# Patient Record
Sex: FEMALE | Race: WHITE | NOT HISPANIC OR LATINO | URBAN - METROPOLITAN AREA
[De-identification: names, ages, dates, MRNs, and addresses within clinical notes are randomized per-mention and may not be internally consistent; named-entity substitution may affect disease eponyms.]

---

## 2020-02-10 ENCOUNTER — OUTPATIENT (OUTPATIENT)
Dept: OUTPATIENT SERVICES | Facility: HOSPITAL | Age: 25
LOS: 1 days | End: 2020-02-10
Payer: COMMERCIAL

## 2020-02-10 VITALS
OXYGEN SATURATION: 100 % | WEIGHT: 143.3 LBS | RESPIRATION RATE: 16 BRPM | SYSTOLIC BLOOD PRESSURE: 127 MMHG | HEIGHT: 62.25 IN | TEMPERATURE: 99 F | HEART RATE: 85 BPM | DIASTOLIC BLOOD PRESSURE: 82 MMHG

## 2020-02-10 DIAGNOSIS — M20.12 HALLUX VALGUS (ACQUIRED), LEFT FOOT: ICD-10-CM

## 2020-02-10 DIAGNOSIS — M21.612 BUNION OF LEFT FOOT: ICD-10-CM

## 2020-02-10 DIAGNOSIS — M20.5X2 OTHER DEFORMITIES OF TOE(S) (ACQUIRED), LEFT FOOT: ICD-10-CM

## 2020-02-10 DIAGNOSIS — M24.575 CONTRACTURE, LEFT FOOT: ICD-10-CM

## 2020-02-10 DIAGNOSIS — Z98.890 OTHER SPECIFIED POSTPROCEDURAL STATES: Chronic | ICD-10-CM

## 2020-02-10 DIAGNOSIS — Z01.818 ENCOUNTER FOR OTHER PREPROCEDURAL EXAMINATION: ICD-10-CM

## 2020-02-10 DIAGNOSIS — Z96.22 MYRINGOTOMY TUBE(S) STATUS: Chronic | ICD-10-CM

## 2020-02-10 DIAGNOSIS — M20.42 OTHER HAMMER TOE(S) (ACQUIRED), LEFT FOOT: ICD-10-CM

## 2020-02-10 DIAGNOSIS — K08.409 PARTIAL LOSS OF TEETH, UNSPECIFIED CAUSE, UNSPECIFIED CLASS: Chronic | ICD-10-CM

## 2020-02-10 LAB
HCT VFR BLD CALC: 40.8 % — SIGNIFICANT CHANGE UP (ref 34.5–45)
HGB BLD-MCNC: 13.8 G/DL — SIGNIFICANT CHANGE UP (ref 11.5–15.5)
MCHC RBC-ENTMCNC: 31.7 PG — SIGNIFICANT CHANGE UP (ref 27–34)
MCHC RBC-ENTMCNC: 33.8 GM/DL — SIGNIFICANT CHANGE UP (ref 32–36)
MCV RBC AUTO: 93.6 FL — SIGNIFICANT CHANGE UP (ref 80–100)
NRBC # BLD: 0 /100 WBCS — SIGNIFICANT CHANGE UP (ref 0–0)
PLATELET # BLD AUTO: 221 K/UL — SIGNIFICANT CHANGE UP (ref 150–400)
RBC # BLD: 4.36 M/UL — SIGNIFICANT CHANGE UP (ref 3.8–5.2)
RBC # FLD: 11.9 % — SIGNIFICANT CHANGE UP (ref 10.3–14.5)
WBC # BLD: 9.85 K/UL — SIGNIFICANT CHANGE UP (ref 3.8–10.5)
WBC # FLD AUTO: 9.85 K/UL — SIGNIFICANT CHANGE UP (ref 3.8–10.5)

## 2020-02-10 PROCEDURE — G0463: CPT

## 2020-02-10 PROCEDURE — 85027 COMPLETE CBC AUTOMATED: CPT

## 2020-02-10 PROCEDURE — 36415 COLL VENOUS BLD VENIPUNCTURE: CPT

## 2020-02-10 NOTE — H&P PST ADULT - HISTORY OF PRESENT ILLNESS
23 yo female presents with a left bunion for as long as she can remember. States that she has the beginnings of a 2nd hammer toe. She reports pain in the foot 2/10 at rest with shoes off and increased to 6/10 pain with activity and shoes on. Denies prior treatment or any current treatment other than rest.

## 2020-02-10 NOTE — H&P PST ADULT - NSICDXFAMILYHX_GEN_ALL_CORE_FT
FAMILY HISTORY:  Father  Still living? Yes, Estimated age: 51-60  Family history of hypertension, Age at diagnosis: Age Unknown    Mother  Still living? No  Family history of DVT, Age at diagnosis: Age Unknown  Family history of hypertension, Age at diagnosis: Age Unknown  Family history of lung cancer, Age at diagnosis: Age Unknown

## 2020-02-10 NOTE — H&P PST ADULT - RS GEN PE MLT RESP DETAILS PC
airway patent/no rales/no wheezes/clear to auscultation bilaterally/no rhonchi/good air movement/breath sounds equal/respirations non-labored

## 2020-02-10 NOTE — H&P PST ADULT - ATTENDING COMMENTS
Pt reports accidentally burning left upper extremity on burning stove last night; c/o mild discomfort over affected area.  PE left upper extremity with approx 3x5cm area of erythema on medio-lateral aspect; possible early blister formation centrally with no break in skin  will prescribe Silvadene oint 1% applied daily, cover w DSD until healed; pt advised to seek medical f/u for signs of infection.  Other than stated issue above, ROS and medical status unchanged since pt saw PMD for medical clearance,    Physician Assistant Statement 02-21-20 Pt reports accidentally burning left upper extremity on hot stove last night; c/o mild discomfort over affected area.  PE left upper extremity with approx 3x5cm area of erythema on medio-lateral aspect; possible early blister formation centrally with no break in skin  will prescribe Silvadene oint 1% applied daily, cover w DSD until healed; pt advised to seek medical f/u for signs of infection.  Other than stated issue above, ROS and medical status unchanged since pt saw PMD for medical clearance,    Physician Assistant Statement 02-21-20

## 2020-02-10 NOTE — H&P PST ADULT - NSANTHOSAYNRD_GEN_A_CORE
No. STEPHEN screening performed.  STOP BANG Legend: 0-2 = LOW Risk; 3-4 = INTERMEDIATE Risk; 5-8 = HIGH Risk/neck 14 inches

## 2020-02-10 NOTE — H&P PST ADULT - NSICDXPASTSURGICALHX_GEN_ALL_CORE_FT
PAST SURGICAL HISTORY:  H/O umbilical hernia repair as infant    S/P tube myringotomy multiple episodes bilateral, last 2010, tubes no longer in    [place] teeth extracted 2018

## 2020-02-10 NOTE — H&P PST ADULT - MUSCULOSKELETAL
details… detailed exam no joint warmth/no calf tenderness/diminished strength/no joint swelling/no joint erythema/tender left bunion/decreased ROM due to pain

## 2020-02-10 NOTE — H&P PST ADULT - NSICDXPROBLEM_GEN_ALL_CORE_FT
PROBLEM DIAGNOSES  Problem: Bunion, left foot  Assessment and Plan: chino akin left foot, tenotomy and capsulotomy digits 2,3,4,5. medical clearance requested. pepcid and surgical wash instructions reviewed and verbalized understanding.

## 2020-02-10 NOTE — H&P PST ADULT - PRO PAIN LIFE ADAPT
Medicare Wellness Visit, Female The best way to live healthy is to have a lifestyle where you eat a well-balanced diet, exercise regularly, limit alcohol use, and quit all forms of tobacco/nicotine, if applicable. Regular preventive services are another way to keep healthy. Preventive services (vaccines, screening tests, monitoring & exams) can help personalize your care plan, which helps you manage your own care. Screening tests can find health problems at the earliest stages, when they are easiest to treat. [facility] follows the current, evidence-based guidelines published by the [facility] ([facility]) when recommending preventive services for our patients. Because we follow these guidelines, sometimes recommendations change over time as research supports it. (For example, mammograms used to be recommended annually. Even though Medicare will still pay for an annual mammogram, the newer guidelines recommend a mammogram every two years for women of average risk.) Of course, you and your doctor may decide to screen more often for some diseases, based on your risk and your health status. Preventive services for you include: - Medicare offers their members a free annual wellness visit, which is time for you and your primary care provider to discuss and plan for your preventive service needs. Take advantage of this benefit every year! 
-All adults over the age of 72 should receive the recommended pneumonia vaccines. Current USPSTF guidelines recommend a series of two vaccines for the best pneumonia protection.  
-All adults should have a flu vaccine yearly and a tetanus vaccine every 10 years. All adults age 61 and older should receive a shingles vaccine once in their lifetime.   
-A bone mass density test is recommended when a woman turns 65 to screen for osteoporosis. This test is only recommended one time, as a screening. Some providers will use this same test as a disease monitoring tool if you already have osteoporosis. -All adults age 38-68 who are overweight should have a diabetes screening test once every three years.  
-Other screening tests and preventive services for persons with diabetes include: an eye exam to screen for diabetic retinopathy, a kidney function test, a foot exam, and stricter control over your cholesterol.  
-Cardiovascular screening for adults with routine risk involves an electrocardiogram (ECG) at intervals determined by your doctor.  
-Colorectal cancer screenings should be done for adults age 54-65 with no increased risk factors for colorectal cancer. There are a number of acceptable methods of screening for this type of cancer. Each test has its own benefits and drawbacks. Discuss with your doctor what is most appropriate for you during your annual wellness visit. The different tests include: colonoscopy (considered the best screening method), a fecal occult blood test, a fecal DNA test, and sigmoidoscopy. -Breast cancer screenings are recommended every other year for women of normal risk, age 54-69. 
-Cervical cancer screenings for women over age 72 are only recommended with certain risk factors.  
-All adults born between [facility] should be screened once for Hepatitis C. Here is a list of your current Health Maintenance items (your personalized list of preventive services) with a due date: There are no preventive care reminders to display for this patient. decreased activity level

## 2020-02-20 ENCOUNTER — TRANSCRIPTION ENCOUNTER (OUTPATIENT)
Age: 25
End: 2020-02-20

## 2020-02-21 ENCOUNTER — RESULT REVIEW (OUTPATIENT)
Age: 25
End: 2020-02-21

## 2020-02-21 ENCOUNTER — OUTPATIENT (OUTPATIENT)
Dept: OUTPATIENT SERVICES | Facility: HOSPITAL | Age: 25
LOS: 1 days | End: 2020-02-21
Payer: COMMERCIAL

## 2020-02-21 VITALS
OXYGEN SATURATION: 98 % | DIASTOLIC BLOOD PRESSURE: 78 MMHG | RESPIRATION RATE: 14 BRPM | HEART RATE: 93 BPM | TEMPERATURE: 98 F | SYSTOLIC BLOOD PRESSURE: 127 MMHG

## 2020-02-21 VITALS
OXYGEN SATURATION: 98 % | TEMPERATURE: 98 F | WEIGHT: 143.3 LBS | HEART RATE: 94 BPM | RESPIRATION RATE: 14 BRPM | SYSTOLIC BLOOD PRESSURE: 134 MMHG | HEIGHT: 62.25 IN | DIASTOLIC BLOOD PRESSURE: 91 MMHG

## 2020-02-21 DIAGNOSIS — M20.5X2 OTHER DEFORMITIES OF TOE(S) (ACQUIRED), LEFT FOOT: ICD-10-CM

## 2020-02-21 DIAGNOSIS — M20.42 OTHER HAMMER TOE(S) (ACQUIRED), LEFT FOOT: ICD-10-CM

## 2020-02-21 DIAGNOSIS — M20.12 HALLUX VALGUS (ACQUIRED), LEFT FOOT: ICD-10-CM

## 2020-02-21 DIAGNOSIS — Z96.22 MYRINGOTOMY TUBE(S) STATUS: Chronic | ICD-10-CM

## 2020-02-21 DIAGNOSIS — K08.409 PARTIAL LOSS OF TEETH, UNSPECIFIED CAUSE, UNSPECIFIED CLASS: Chronic | ICD-10-CM

## 2020-02-21 DIAGNOSIS — M24.575 CONTRACTURE, LEFT FOOT: ICD-10-CM

## 2020-02-21 DIAGNOSIS — Z98.890 OTHER SPECIFIED POSTPROCEDURAL STATES: Chronic | ICD-10-CM

## 2020-02-21 PROCEDURE — 88304 TISSUE EXAM BY PATHOLOGIST: CPT | Mod: 26

## 2020-02-21 PROCEDURE — 73620 X-RAY EXAM OF FOOT: CPT

## 2020-02-21 PROCEDURE — 28299 COR HLX VLGS DOUBLE OSTEOT: CPT | Mod: LT

## 2020-02-21 PROCEDURE — 73620 X-RAY EXAM OF FOOT: CPT | Mod: 26,LT

## 2020-02-21 PROCEDURE — C1713: CPT

## 2020-02-21 PROCEDURE — 28270 RELEASE OF FOOT CONTRACTURE: CPT | Mod: LT

## 2020-02-21 PROCEDURE — 88304 TISSUE EXAM BY PATHOLOGIST: CPT

## 2020-02-21 PROCEDURE — 28272 RELEASE OF TOE JOINT EACH: CPT | Mod: T2

## 2020-02-21 RX ORDER — SODIUM CHLORIDE 9 MG/ML
1000 INJECTION, SOLUTION INTRAVENOUS
Refills: 0 | Status: DISCONTINUED | OUTPATIENT
Start: 2020-02-21 | End: 2020-02-21

## 2020-02-21 RX ORDER — HYDROMORPHONE HYDROCHLORIDE 2 MG/ML
1 INJECTION INTRAMUSCULAR; INTRAVENOUS; SUBCUTANEOUS
Refills: 0 | Status: DISCONTINUED | OUTPATIENT
Start: 2020-02-21 | End: 2020-02-21

## 2020-02-21 RX ORDER — ONDANSETRON 8 MG/1
4 TABLET, FILM COATED ORAL ONCE
Refills: 0 | Status: DISCONTINUED | OUTPATIENT
Start: 2020-02-21 | End: 2020-02-21

## 2020-02-21 RX ORDER — HYDROMORPHONE HYDROCHLORIDE 2 MG/ML
0.5 INJECTION INTRAMUSCULAR; INTRAVENOUS; SUBCUTANEOUS
Refills: 0 | Status: DISCONTINUED | OUTPATIENT
Start: 2020-02-21 | End: 2020-02-21

## 2020-02-21 RX ADMIN — SODIUM CHLORIDE 50 MILLILITER(S): 9 INJECTION, SOLUTION INTRAVENOUS at 09:37

## 2020-02-21 NOTE — BRIEF OPERATIVE NOTE - NSICDXBRIEFPOSTOP_GEN_ALL_CORE_FT
POST-OP DIAGNOSIS:  Hammertoe of left foot 21-Feb-2020 13:15:54  Jaden Sultana  Bunion of left foot 21-Feb-2020 13:15:46  Jaden Sultana

## 2020-02-21 NOTE — ASU PATIENT PROFILE, ADULT - VISION (WITH CORRECTIVE LENSES IF THE PATIENT USUALLY WEARS THEM):
Partially impaired: cannot see medication labels or newsprint, but can see obstacles in path, and the surrounding layout; can count fingers at arm's length distance glasses only/Normal vision: sees adequately in most situations; can see medication labels, newsprint

## 2020-02-21 NOTE — BRIEF OPERATIVE NOTE - NSICDXBRIEFPREOP_GEN_ALL_CORE_FT
PRE-OP DIAGNOSIS:  Hammertoe of left foot 21-Feb-2020 13:15:33 2-5 Jaden Sultana  Bunion, left foot 21-Feb-2020 13:15:23  Jaden Sultana

## 2020-02-21 NOTE — ASU DISCHARGE PLAN (ADULT/PEDIATRIC) - CARE PROVIDER_API CALL
Jaden Sultana (DPM)  Podiatric Medicine and Surgery  [address] [place] [address]  Phone: [phone]  Fax: [phone]  Follow Up Time: 1-3 days

## 2020-02-21 NOTE — ASU DISCHARGE PLAN (ADULT/PEDIATRIC) - FREQUENT HAND WASHING PREVENTS THE SPREAD OF INFECTION.
Pt calling, states he spoke to his insurance and they told him this was denied.  He is wanting to know what else he can have? pls advise Statement Selected

## 2020-02-21 NOTE — ASU DISCHARGE PLAN (ADULT/PEDIATRIC) - NURSING INSTRUCTIONS
All discharge instructions reviewed with patient including pain, safety, medication, ambulation with cane and follow up care.  Pt acknowledges understanding

## 2020-02-21 NOTE — ASU DISCHARGE PLAN (ADULT/PEDIATRIC) - ACTIVITY LEVEL
No sports/gym/Weight bearing as tolerated/No excercise/No heavy lifting/Elevate extremity/No intercourse

## 2020-02-21 NOTE — BRIEF OPERATIVE NOTE - NSICDXBRIEFPROCEDURE_GEN_ALL_CORE_FT
PROCEDURES:  Capsulotomy, joint, toe 21-Feb-2020 13:14:57 2nd MTPJ release Jaden Sultana  Tenotomy, flexor, toe, open 21-Feb-2020 13:14:45  Jaden Sultana  Lawrence bunionectomy 21-Feb-2020 13:14:33  Jaden Sultana  Akin osteotomy 21-Feb-2020 13:14:22  Jaden Sultana  Modified Pang bunionectomy 21-Feb-2020 13:14:08  Jaden Sultana

## 2020-02-21 NOTE — ASU PATIENT PROFILE, ADULT - PSH
H/O umbilical hernia repair  as infant  S/P tube myringotomy  multiple episodes bilateral, last 2010, tubes no longer in  [place] teeth extracted  2018

## 2020-02-25 LAB — SURGICAL PATHOLOGY STUDY: SIGNIFICANT CHANGE UP

## 2020-03-20 PROBLEM — M21.612 BUNION OF LEFT FOOT: Chronic | Status: ACTIVE | Noted: 2020-02-10

## 2020-08-05 PROBLEM — Z00.00 ENCOUNTER FOR PREVENTIVE HEALTH EXAMINATION: Status: ACTIVE | Noted: 2020-08-05

## 2020-08-11 ENCOUNTER — OUTPATIENT (OUTPATIENT)
Dept: OUTPATIENT SERVICES | Facility: HOSPITAL | Age: 25
LOS: 1 days | End: 2020-08-11
Payer: COMMERCIAL

## 2020-08-11 VITALS
RESPIRATION RATE: 18 BRPM | WEIGHT: 148.15 LBS | HEART RATE: 71 BPM | OXYGEN SATURATION: 100 % | HEIGHT: 62.25 IN | DIASTOLIC BLOOD PRESSURE: 78 MMHG | TEMPERATURE: 99 F | SYSTOLIC BLOOD PRESSURE: 120 MMHG

## 2020-08-11 DIAGNOSIS — M20.5X1 OTHER DEFORMITIES OF TOE(S) (ACQUIRED), RIGHT FOOT: ICD-10-CM

## 2020-08-11 DIAGNOSIS — Z98.890 OTHER SPECIFIED POSTPROCEDURAL STATES: Chronic | ICD-10-CM

## 2020-08-11 DIAGNOSIS — M20.41 OTHER HAMMER TOE(S) (ACQUIRED), RIGHT FOOT: ICD-10-CM

## 2020-08-11 DIAGNOSIS — Z01.818 ENCOUNTER FOR OTHER PREPROCEDURAL EXAMINATION: ICD-10-CM

## 2020-08-11 DIAGNOSIS — M24.574 CONTRACTURE, RIGHT FOOT: ICD-10-CM

## 2020-08-11 DIAGNOSIS — K08.409 PARTIAL LOSS OF TEETH, UNSPECIFIED CAUSE, UNSPECIFIED CLASS: Chronic | ICD-10-CM

## 2020-08-11 DIAGNOSIS — M21.612 BUNION OF LEFT FOOT: Chronic | ICD-10-CM

## 2020-08-11 DIAGNOSIS — Z96.22 MYRINGOTOMY TUBE(S) STATUS: Chronic | ICD-10-CM

## 2020-08-11 DIAGNOSIS — M20.11 HALLUX VALGUS (ACQUIRED), RIGHT FOOT: ICD-10-CM

## 2020-08-11 LAB
HCT VFR BLD CALC: 41.6 % — SIGNIFICANT CHANGE UP (ref 34.5–45)
HGB BLD-MCNC: 13.6 G/DL — SIGNIFICANT CHANGE UP (ref 11.5–15.5)
MCHC RBC-ENTMCNC: 30.8 PG — SIGNIFICANT CHANGE UP (ref 27–34)
MCHC RBC-ENTMCNC: 32.7 GM/DL — SIGNIFICANT CHANGE UP (ref 32–36)
MCV RBC AUTO: 94.1 FL — SIGNIFICANT CHANGE UP (ref 80–100)
NRBC # BLD: 0 /100 WBCS — SIGNIFICANT CHANGE UP (ref 0–0)
PLATELET # BLD AUTO: 240 K/UL — SIGNIFICANT CHANGE UP (ref 150–400)
RBC # BLD: 4.42 M/UL — SIGNIFICANT CHANGE UP (ref 3.8–5.2)
RBC # FLD: 12.6 % — SIGNIFICANT CHANGE UP (ref 10.3–14.5)
WBC # BLD: 7.46 K/UL — SIGNIFICANT CHANGE UP (ref 3.8–10.5)
WBC # FLD AUTO: 7.46 K/UL — SIGNIFICANT CHANGE UP (ref 3.8–10.5)

## 2020-08-11 PROCEDURE — 36415 COLL VENOUS BLD VENIPUNCTURE: CPT

## 2020-08-11 PROCEDURE — G0463: CPT

## 2020-08-11 PROCEDURE — 85027 COMPLETE CBC AUTOMATED: CPT

## 2020-08-11 RX ORDER — SODIUM CHLORIDE 9 MG/ML
1000 INJECTION, SOLUTION INTRAVENOUS
Refills: 0 | Status: DISCONTINUED | OUTPATIENT
Start: 2020-08-21 | End: 2020-08-21

## 2020-08-11 RX ORDER — ACETAMINOPHEN 500 MG
2 TABLET ORAL
Qty: 0 | Refills: 0 | DISCHARGE

## 2020-08-11 NOTE — H&P PST ADULT - NSICDXPROBLEM_GEN_ALL_CORE_FT
PROBLEM DIAGNOSES  Problem: Other deformities of toe(s) (acquired), right foot  Assessment and Plan: Scheduled for Lawrence Marino right foot with Dr Sultana on 08/21/2020.  Pre op instructions given and paitnet verbalized understanding.  Chlorhexidine wash given with instructions.  CBC and medical clearance pending.  UCG in AM of procedure.  [place] [facility]

## 2020-08-11 NOTE — H&P PST ADULT - NS PRO TALK SOMEONE YN
CC: consult heme positive stool    History of Present Illness:  Naila Gallegos is a 67 year old female who presents at the request of Dr. Mauricio Diaz in regards to positive FIT.  Last colonoscopy done in 3/11/2005 by Dr. Roberson was incomplete due to nonflexible colon, showing mild diverticulosis. S/p barium enema 3/12/17 which was unremarkable, normal. Patient reports a bowel movement every day which is formed to soft. She had constipation one time after eating too much cheese around Thanksgiving time.    History of GERD with symptoms controlled on omeprazole 20 mg daily   For the past 2 years. Patient reports a history of gastric ulcers in the past. About 2 years ago she started to have epigastric pain, nausea, and epigastric discomfort after eating shrimp, and her symptoms were similar with the time she had gastric ulcer. She was restarted on PPI therapy with symptoms controlled since then. Patient lost weight intentionally to help with diabetes. She is very active.Patient denies unintentional weight loss, dysphagia, abdominal pain/cramping, nausea/vomiting, diarrhea, change in bowel habits, hematochezia, or melena.  No other GI complaints.    Past Medical History:   Diagnosis Date   • Diabetes mellitus (CMS/HCC)    • HTN (hypertension)    • Hyperlipidemia    • Hypothyroid    • Obesity    • PUD (peptic ulcer disease)        Past Surgical History:   Procedure Laterality Date   • COLONOSCOPY  3/11/2005   • DEXA BONE DENSITY AXIAL SKELETON  2002   • HYSTERECTOMY  2010   • LUMBAR LAMINECTOMY     • REMOVAL GALLBLADDER      Cholecystectomy (gallbladder)       Family History   Problem Relation Age of Onset   • Heart disease Mother       of congestive heart failure at age 72   • OTHER Father      Hemorrhaged with dental work at 63 and    • Substance abuse Brother    • Diabetes Brother    • Heart Sister      valve replacement       Social History     Social History   • Marital status:       Spouse name: N/A   • Number of children: N/A   • Years of education: N/A     Social History Main Topics   • Smoking status: Never Smoker   • Smokeless tobacco: Never Used   • Alcohol use No   • Drug use: No   • Sexual activity: Not on file     Other Topics Concern   • Not on file     Social History Narrative   • No narrative on file       Current Outpatient Prescriptions   Medication   • DIOVAN -25 MG per tablet   • omeprazole (PRILOSEC) 20 MG capsule   • levothyroxine (SYNTHROID) 100 MCG tablet   • metformin (GLUCOPHAGE-XR) 500 MG 24 hr tablet   • lovastatin (MEVACOR) 40 MG tablet   • DiphenhydrAMINE HCl, Sleep, 50 MG Cap   • Cholecalciferol 1000 UNITS TABS     No current facility-administered medications for this visit.        Allergies as of 01/09/2018 - Reviewed 10/20/2017   Allergen Reaction Noted   • Codeine     • Aspirin Other (See Comments)        Review of Systems:  CONSTITUTIONAL:  Denies fatigue, fever, or headaches.   EYES:  Denies visual blurring or double vision.   CARDIOVASCULAR:  Denies chest discomfort, dyspnea on exertion, or palpitations.   RESPIRATORY:  Denies cough and shortness of breath.   GASTROINTESTINAL:  See History of Present Illness.   GENITOURINARY:  Denies frequency, dysuria, urgency, hesitancy or hematuria.   MUSCULOSKELETAL:  Denies joint pain or muscle aches.   SKIN:  Denies rashes or nodules.  NEUROLOGIC:  Denies numbness, tingling, or weakness.  Denies speech or gait disturbances.   ENDOCRINE:  Denies cold intolerance, heat intolerance, polyphagia, polydipsia, or polyuria.  HEMATOLOGIC/LYMPHATIC:  Denies abnormal bruising or bleeding, lumps or bumps.      There were no vitals taken for this visit.    Physical Exam:  General:  Alert, no acute distress  HEENT:  Moist oral mucous membranes.  Sclerae nonicteric.   Neck:  Supple, no jugular venous distension.  Trachea midline.  Chest/Lungs:  Normal effort.  Symmetrical expansion.  Clear to auscultation.  No wheezes,  rales or rhonchi.  Cardiovascular:  Regular rate and rhythm. S1, S2, no murmur, rub, or gallop.  Abdomen:  Normoactive bowel sounds.  Abdomen is obese, protuberant,  soft, nontender, nondistended.  No hepatosplenomegaly.  Neurologic: Alert and oriented x3.   Skin: Warm, dry, intact.  No rashes.     IMPRESSION/PLAN:  1. Positive fecal occult blood test. Last colon 2005 incomplete, s/p barium enema which shows normal colon, no polyps.     2. History of GERD in obese patient, with symptoms  well-controlled on omeprazole 20 mg daily for the past 2 years .      - Plan for EGD as patient is on long term PPI and has a history of gastric ulcers. We will do at the same time colonoscopy for positive fecal occult blood test. Patient agreeable with the plan.    - Continue Omeprazole 20 mg daily   - Strict GERD diet    -  Follow-up in the office as needed, unless otherwise indicated by GI physician post endoscopic evaluation               no

## 2020-08-11 NOTE — H&P PST ADULT - NSICDXPASTSURGICALHX_GEN_ALL_CORE_FT
PAST SURGICAL HISTORY:  Bunion, left foot repain 2/2020    H/O umbilical hernia repair as infant    S/P tube myringotomy multiple episodes bilateral, last 2010, tubes no longer in    [place] teeth extracted 2018

## 2020-08-11 NOTE — H&P PST ADULT - RS GEN PE MLT RESP DETAILS PC
respirations non-labored/normal/breath sounds equal/good air movement/clear to auscultation bilaterally/airway patent

## 2020-08-11 NOTE — H&P PST ADULT - ATTENDING COMMENTS
Physician Assistant Statement     I have interviewed the patient and reviewed the chart.   There have been no changes in the patient  s medical or physical history   since her PST's and medical clearance.

## 2020-08-11 NOTE — H&P PST ADULT - HISTORY OF PRESENT ILLNESS
23 y/o female presents for PST.  Patient is scheduled for Lawrence olivia right foot on 08/21/2020 with Dr Sultana.  COVID 19 testing scheduled for 08/18/2020.

## 2020-08-11 NOTE — H&P PST ADULT - NSANTHOSAYNRD_GEN_A_CORE
No. STEPHEN screening performed.  STOP BANG Legend: 0-2 = LOW Risk; 3-4 = INTERMEDIATE Risk; 5-8 = HIGH Risk/neck 14 1/2 inches

## 2020-08-11 NOTE — H&P PST ADULT - NSICDXFAMILYHX_GEN_ALL_CORE_FT
FAMILY HISTORY:  FH: lymphoma, mother    Father  Still living? Yes, Estimated age: 51-60  Family history of hypertension, Age at diagnosis: Age Unknown    Mother  Still living? No  Family history of DVT, Age at diagnosis: Age Unknown  Family history of hypertension, Age at diagnosis: Age Unknown  Family history of lung cancer, Age at diagnosis: Age Unknown

## 2020-08-16 DIAGNOSIS — Z01.818 ENCOUNTER FOR OTHER PREPROCEDURAL EXAMINATION: ICD-10-CM

## 2020-08-18 ENCOUNTER — APPOINTMENT (OUTPATIENT)
Dept: DISASTER EMERGENCY | Facility: CLINIC | Age: 25
End: 2020-08-18

## 2020-08-19 LAB — SARS-COV-2 N GENE NPH QL NAA+PROBE: NOT DETECTED

## 2020-08-20 ENCOUNTER — TRANSCRIPTION ENCOUNTER (OUTPATIENT)
Age: 25
End: 2020-08-20

## 2020-08-21 ENCOUNTER — RESULT REVIEW (OUTPATIENT)
Age: 25
End: 2020-08-21

## 2020-08-21 ENCOUNTER — OUTPATIENT (OUTPATIENT)
Dept: OUTPATIENT SERVICES | Facility: HOSPITAL | Age: 25
LOS: 1 days | End: 2020-08-21
Payer: COMMERCIAL

## 2020-08-21 VITALS
SYSTOLIC BLOOD PRESSURE: 121 MMHG | OXYGEN SATURATION: 100 % | RESPIRATION RATE: 16 BRPM | HEART RATE: 96 BPM | TEMPERATURE: 98 F | DIASTOLIC BLOOD PRESSURE: 80 MMHG

## 2020-08-21 VITALS
OXYGEN SATURATION: 98 % | SYSTOLIC BLOOD PRESSURE: 129 MMHG | TEMPERATURE: 98 F | RESPIRATION RATE: 16 BRPM | WEIGHT: 148.15 LBS | DIASTOLIC BLOOD PRESSURE: 81 MMHG | HEIGHT: 62.25 IN | HEART RATE: 79 BPM

## 2020-08-21 DIAGNOSIS — M20.41 OTHER HAMMER TOE(S) (ACQUIRED), RIGHT FOOT: ICD-10-CM

## 2020-08-21 DIAGNOSIS — K08.409 PARTIAL LOSS OF TEETH, UNSPECIFIED CAUSE, UNSPECIFIED CLASS: Chronic | ICD-10-CM

## 2020-08-21 DIAGNOSIS — Z96.22 MYRINGOTOMY TUBE(S) STATUS: Chronic | ICD-10-CM

## 2020-08-21 DIAGNOSIS — M20.11 HALLUX VALGUS (ACQUIRED), RIGHT FOOT: ICD-10-CM

## 2020-08-21 DIAGNOSIS — M24.574 CONTRACTURE, RIGHT FOOT: ICD-10-CM

## 2020-08-21 DIAGNOSIS — Z98.890 OTHER SPECIFIED POSTPROCEDURAL STATES: Chronic | ICD-10-CM

## 2020-08-21 DIAGNOSIS — M20.5X1 OTHER DEFORMITIES OF TOE(S) (ACQUIRED), RIGHT FOOT: ICD-10-CM

## 2020-08-21 DIAGNOSIS — M21.612 BUNION OF LEFT FOOT: Chronic | ICD-10-CM

## 2020-08-21 PROCEDURE — 88311 DECALCIFY TISSUE: CPT | Mod: 26

## 2020-08-21 PROCEDURE — 28270 RELEASE OF FOOT CONTRACTURE: CPT | Mod: T6

## 2020-08-21 PROCEDURE — 73620 X-RAY EXAM OF FOOT: CPT | Mod: 26,RT

## 2020-08-21 PROCEDURE — C1713: CPT

## 2020-08-21 PROCEDURE — 88304 TISSUE EXAM BY PATHOLOGIST: CPT | Mod: 26

## 2020-08-21 PROCEDURE — 88304 TISSUE EXAM BY PATHOLOGIST: CPT

## 2020-08-21 PROCEDURE — 28299 COR HLX VLGS DOUBLE OSTEOT: CPT | Mod: RT

## 2020-08-21 PROCEDURE — 73620 X-RAY EXAM OF FOOT: CPT

## 2020-08-21 PROCEDURE — 88311 DECALCIFY TISSUE: CPT

## 2020-08-21 RX ORDER — HYDROMORPHONE HYDROCHLORIDE 2 MG/ML
0.5 INJECTION INTRAMUSCULAR; INTRAVENOUS; SUBCUTANEOUS ONCE
Refills: 0 | Status: DISCONTINUED | OUTPATIENT
Start: 2020-08-21 | End: 2020-08-21

## 2020-08-21 RX ORDER — ACETAMINOPHEN 500 MG
2 TABLET ORAL
Qty: 0 | Refills: 0 | DISCHARGE

## 2020-08-21 RX ORDER — L.ACIDOPH/B.ANIMALIS/B.LONGUM 15B CELL
2 CAPSULE ORAL
Qty: 0 | Refills: 0 | DISCHARGE

## 2020-08-21 RX ORDER — DESOGESTREL AND ETHINYL ESTRADIOL 0.15-0.03
1 KIT ORAL
Qty: 0 | Refills: 0 | DISCHARGE

## 2020-08-21 RX ORDER — ONDANSETRON 8 MG/1
4 TABLET, FILM COATED ORAL ONCE
Refills: 0 | Status: DISCONTINUED | OUTPATIENT
Start: 2020-08-21 | End: 2020-08-21

## 2020-08-21 RX ORDER — HYDROMORPHONE HYDROCHLORIDE 2 MG/ML
1 INJECTION INTRAMUSCULAR; INTRAVENOUS; SUBCUTANEOUS ONCE
Refills: 0 | Status: DISCONTINUED | OUTPATIENT
Start: 2020-08-21 | End: 2020-08-21

## 2020-08-21 RX ORDER — ASCORBIC ACID 60 MG
1 TABLET,CHEWABLE ORAL
Qty: 0 | Refills: 0 | DISCHARGE

## 2020-08-21 RX ORDER — SODIUM CHLORIDE 9 MG/ML
1000 INJECTION, SOLUTION INTRAVENOUS
Refills: 0 | Status: DISCONTINUED | OUTPATIENT
Start: 2020-08-21 | End: 2020-08-21

## 2020-08-21 RX ADMIN — SODIUM CHLORIDE 50 MILLILITER(S): 9 INJECTION, SOLUTION INTRAVENOUS at 13:30

## 2020-08-21 NOTE — ASU PATIENT PROFILE, ADULT - PSH
Karen, left foot  repain 2/2020  H/O umbilical hernia repair  as infant  S/P tube myringotomy  multiple episodes bilateral, last 2010, tubes no longer in  [place] teeth extracted  2018 Karen, left foot  repair 2/2020  H/O umbilical hernia repair  as infant  S/P tube myringotomy  multiple episodes bilateral, last 2010, tubes no longer in  [place] teeth extracted  2018

## 2020-08-21 NOTE — BRIEF OPERATIVE NOTE - NSICDXBRIEFPREOP_GEN_ALL_CORE_FT
PRE-OP DIAGNOSIS:  Hammertoe of right foot 21-Aug-2020 17:04:12 2-5 Jaden Sultana  Hav (hallux abducto valgus), right 21-Aug-2020 17:04:04  Jaden Sultana

## 2020-08-21 NOTE — PRE-ANESTHESIA EVALUATION ADULT - NSANTHADDINFOFT_GEN_ALL_CORE
Discussed IVS in detail with patient and all questions sought and answered. Pt. agrees to all plans and wishes to proceed with surgical care.    Medical evaluation/optimization and clearance noted and appreciated.

## 2020-08-21 NOTE — ASU DISCHARGE PLAN (ADULT/PEDIATRIC) - CARE PROVIDER_API CALL
Jaden Sultana  PODIATRIC MEDICINE AND SURGERY  [facility] [place] [address]  Phone: [phone]  Fax: [phone]  Follow Up Time: 1-3 days

## 2020-08-21 NOTE — BRIEF OPERATIVE NOTE - NSICDXBRIEFPOSTOP_GEN_ALL_CORE_FT
POST-OP DIAGNOSIS:  Hammertoe of right foot 21-Aug-2020 17:04:38  Jaden Sultana  Hav (hallux abducto valgus), right 21-Aug-2020 17:04:26  Jaden Sultana

## 2020-08-21 NOTE — ASU PATIENT PROFILE, ADULT - VISION (WITH CORRECTIVE LENSES IF THE PATIENT USUALLY WEARS THEM):
Partially impaired: cannot see medication labels or newsprint, but can see obstacles in path, and the surrounding layout; can count fingers at arm's length Normal vision: sees adequately in most situations; can see medication labels, newsprint/distance glasses

## 2020-08-25 LAB — SURGICAL PATHOLOGY STUDY: SIGNIFICANT CHANGE UP

## 2022-04-28 NOTE — BRIEF OPERATIVE NOTE - OPERATION/FINDINGS
right foot offset V osteotomy, akin bunionectomy, modified Bird, 2-5 flexor tenotomy and capsulotomy, 2nd MTPJ release
Detail Level: Detailed
Detail Level: Generalized

## 2022-07-25 NOTE — ASU PATIENT PROFILE, ADULT - NS SC CAGE ALCOHOL GUILTY ABOUT
[FreeTextEntry1] : 77yo F, obese w/ h/o hypothyroidism, chronic neck pain presenting to cardiology clinic w/ cc/o palpitations. Patient states over the past few weeks she's felt her heart racing independent of activity. She also notes dyspnea w/ minimal exertion, she also notes persistent lower extremity edema. She otherwise denies any F/C, CP, NVD, othopnea/ PND or syncope/
no

## 2023-04-19 NOTE — H&P PST ADULT - GENITOURINARY
Detail Level: Detailed Depth Of Biopsy: dermis Was A Bandage Applied: Yes Size Of Lesion In Cm: 0 Biopsy Type: H and E Biopsy Method: Personna blade Anesthesia Type: 1% lidocaine with epinephrine 1:100,000 buffered with 8.4% sodium bicarbonate (1:9 ratio) Anesthesia Volume In Cc (Will Not Render If 0): 1 Hemostasis: Aluminum Chloride Wound Care: Petrolatum Dressing: bandage Destruction After The Procedure: No Type Of Destruction Used: Curettage Curettage Text: The wound bed was treated with curettage after the biopsy was performed. Cryotherapy Text: The wound bed was treated with cryotherapy after the biopsy was performed. Electrodesiccation Text: The wound bed was treated with electrodesiccation after the biopsy was performed. Electrodesiccation And Curettage Text: The wound bed was treated with electrodesiccation and curettage after the biopsy was performed. Silver Nitrate Text: The wound bed was treated with silver nitrate after the biopsy was performed. Lab: [id number] Lab Facility: [id number] Consent: Verbal consent was obtained and risks were reviewed including but not limited to scarring, pain, infection, bleeding, scabbing, incomplete removal, poor cosmetic results, nerve damage and allergy to anesthesia. Post-Care Instructions: I reviewed with the patient in detail post-care instructions. Patient is to keep the biopsy site dry overnight, and then apply vaseline or aquaphor twice daily until healed. Patient may apply hydrogen peroxide soaks to remove any crusting if needed. Notification Instructions: Patient will be notified of biopsy results. However, patient instructed to call the office if not contacted within 2 weeks. Billing Type: Third-Party Bill Information: Selecting Yes will display possible errors in your note based on the variables you have selected. This validation is only offered as a suggestion for you. PLEASE NOTE THAT THE VALIDATION TEXT WILL BE REMOVED WHEN YOU FINALIZE YOUR NOTE. IF YOU WANT TO FAX A PRELIMINARY NOTE YOU WILL NEED TO TOGGLE THIS TO 'NO' IF YOU DO NOT WANT IT IN YOUR FAXED NOTE. negative

## 2025-03-18 NOTE — ASU PREOP CHECKLIST - DNR CLARIFICATION FORM COMPLETED
Patient Education        Well Visit, Ages 18 to 65: Care Instructions  Well visits can help you stay healthy. Your doctor has checked your overall health and may have suggested ways to take good care of yourself. Your doctor also may have recommended tests. You can help prevent illness with healthy eating, good sleep, vaccinations, regular exercise, and other steps.    Get the tests that you and your doctor decide on. Depending on your age and risks, examples might include screening for diabetes; hepatitis C; HIV; and cervical, breast, lung, and colon cancer. Screening helps find diseases before any symptoms appear.   Eat healthy foods. Choose fruits, vegetables, whole grains, lean protein, and low-fat dairy foods. Limit saturated fat and reduce salt.     Limit alcohol. Men should have no more than 2 drinks a day. Women should have no more than 1. For some people, no alcohol is the best choice.   Exercise. Get at least 30 minutes of exercise on most days of the week. Walking can be a good choice.     Reach and stay at your healthy weight. This will lower your risk for many health problems.   Take care of your mental health. Try to stay connected with friends, family, and community, and find ways to manage stress.     If you're feeling depressed or hopeless, talk to someone. A counselor can help. If you don't have a counselor, talk to your doctor.   Talk to your doctor if you think you may have a problem with alcohol or drug use. This includes prescription medicines, marijuana, and other drugs.     Avoid tobacco and nicotine: Don't smoke, vape, or chew. If you need help quitting, talk to your doctor.   Practice safer sex. Getting tested, using condoms or dental dams, and limiting sex partners can help prevent STIs.     Use birth control if it's important to you to prevent pregnancy. Talk with your doctor about your choices and what might be best for you.   Prevent problems where you can. Protect your skin from too 
n/a